# Patient Record
Sex: FEMALE | Race: BLACK OR AFRICAN AMERICAN | NOT HISPANIC OR LATINO | Employment: UNEMPLOYED | URBAN - METROPOLITAN AREA
[De-identification: names, ages, dates, MRNs, and addresses within clinical notes are randomized per-mention and may not be internally consistent; named-entity substitution may affect disease eponyms.]

---

## 2023-09-10 ENCOUNTER — HOSPITAL ENCOUNTER (OUTPATIENT)
Dept: DATA CONVERSION | Facility: HOSPITAL | Age: 28
Discharge: HOME | End: 2023-09-10
Payer: MEDICAID

## 2023-09-10 DIAGNOSIS — N39.0 URINARY TRACT INFECTION, SITE NOT SPECIFIED: ICD-10-CM

## 2023-09-10 DIAGNOSIS — S00.83XA CONTUSION OF OTHER PART OF HEAD, INITIAL ENCOUNTER: ICD-10-CM
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
## 2023-09-10 DIAGNOSIS — M54.50 LOW BACK PAIN, UNSPECIFIED: ICD-10-CM

## 2023-09-10 DIAGNOSIS — R51.9 HEADACHE, UNSPECIFIED: ICD-10-CM

## 2023-09-10 DIAGNOSIS — M54.2 CERVICALGIA: ICD-10-CM

## 2023-09-10 LAB
ALBUMIN SERPL-MCNC: 4.1 GM/DL (ref 3.5–5)
ALBUMIN/GLOB SERPL: 1.2 RATIO (ref 1.5–3)
ALP BLD-CCNC: 70 U/L (ref 35–125)
ALT SERPL-CCNC: 13 U/L (ref 5–40)
AMOXICILLIN+CLAV SUSC ISLT: NORMAL
AMPICILLIN+SULBAC SUSC ISLT: NORMAL
ANION GAP SERPL CALCULATED.3IONS-SCNC: 7 MMOL/L (ref 0–19)
AST SERPL-CCNC: 19 U/L (ref 5–40)
BACTERIA ISLT: NORMAL
BACTERIA SPEC CULT: NORMAL
BACTERIA UR QL AUTO: POSITIVE
BASOPHILS # BLD AUTO: 0.04 K/UL (ref 0–0.22)
BASOPHILS NFR BLD AUTO: 0.3 % (ref 0–1)
BILIRUB SERPL-MCNC: 0.9 MG/DL (ref 0.1–1.2)
BILIRUB UR QL STRIP.AUTO: NEGATIVE
BUN SERPL-MCNC: 9 MG/DL (ref 8–25)
BUN/CREAT SERPL: 11.3 RATIO (ref 8–21)
CALCIUM SERPL-MCNC: 9.2 MG/DL (ref 8.5–10.4)
CC # UR: NORMAL /UL
CEFAZOLIN SUSC ISLT: NORMAL
CHLORIDE SERPL-SCNC: 103 MMOL/L (ref 97–107)
CIPROFLOXACIN SUSC ISLT: NORMAL
CLARITY UR: ABNORMAL
CO2 SERPL-SCNC: 25 MMOL/L (ref 24–31)
COLOR UR: YELLOW
CREAT SERPL-MCNC: 0.8 MG/DL (ref 0.4–1.6)
DEPRECATED RDW RBC AUTO: 46.5 FL (ref 37–54)
DIFFERENTIAL METHOD BLD: ABNORMAL
EOSINOPHIL # BLD AUTO: 0.01 K/UL (ref 0–0.45)
EOSINOPHIL NFR BLD: 0.1 % (ref 0–3)
ERYTHROCYTE [DISTWIDTH] IN BLOOD BY AUTOMATED COUNT: 14.9 % (ref 11.7–15)
GENTAMICIN ISLT MLC: NORMAL
GFR SERPL CREATININE-BSD FRML MDRD: 102 ML/MIN/1.73 M2
GLOBULIN SER-MCNC: 3.3 G/DL (ref 1.9–3.7)
GLUCOSE SERPL-MCNC: 88 MG/DL (ref 65–99)
GLUCOSE UR STRIP.AUTO-MCNC: NEGATIVE MG/DL
HCG UR QL: NEGATIVE
HCT VFR BLD AUTO: 38.2 % (ref 36–44)
HGB BLD-MCNC: 12.8 GM/DL (ref 12–15)
HGB UR QL STRIP.AUTO: 3 /HPF (ref 0–3)
HGB UR QL: ABNORMAL
HYALINE CASTS UR QL AUTO: 6 /LPF
IMM GRANULOCYTES # BLD AUTO: 0.04 K/UL (ref 0–0.1)
KETONES UR QL STRIP.AUTO: ABNORMAL
LEUKOCYTE ESTERASE UR QL STRIP.AUTO: ABNORMAL
LEVOFLOXACIN SUSC ISLT: NORMAL
LYMPHOCYTES # BLD AUTO: 1.77 K/UL (ref 1.2–3.2)
LYMPHOCYTES NFR BLD MANUAL: 13 % (ref 20–40)
MCH RBC QN AUTO: 28.6 PG (ref 26–34)
MCHC RBC AUTO-ENTMCNC: 33.5 % (ref 31–37)
MCV RBC AUTO: 85.5 FL (ref 80–100)
MEROPENEM SUSC ISLT: NORMAL
MIC (SUSCEPTIBILITY): NORMAL
MICROSCOPIC (UA): ABNORMAL
MONOCYTES # BLD AUTO: 1.26 K/UL (ref 0–0.8)
MONOCYTES NFR BLD MANUAL: 9.3 % (ref 0–8)
NEUTROPHILS # BLD AUTO: 10.46 K/UL
NEUTROPHILS # BLD AUTO: 10.46 K/UL (ref 1.8–7.7)
NEUTROPHILS.IMMATURE NFR BLD: 0.3 % (ref 0–1)
NEUTS SEG NFR BLD: 77 % (ref 50–70)
NITRITE UR QL STRIP.AUTO: NEGATIVE
NITROFURANTOIN SUSC ISLT: NORMAL
NRBC BLD-RTO: 0 /100 WBC
PH UR STRIP.AUTO: 5.5 [PH] (ref 4.6–8)
PIP+TAZO SUSC ISLT: NORMAL
PLATELET # BLD AUTO: 273 K/UL (ref 150–450)
PMV BLD AUTO: 9.7 CU (ref 7–12.6)
POTASSIUM SERPL-SCNC: 3.9 MMOL/L (ref 3.4–5.1)
PROT SERPL-MCNC: 7.4 G/DL (ref 5.9–7.9)
PROT UR STRIP.AUTO-MCNC: ABNORMAL MG/DL
RBC # BLD AUTO: 4.47 M/UL (ref 4–4.9)
REPORT STATUS -LH SQ DATA CONVERSION: NORMAL
SERVICE CMNT-IMP: NORMAL
SODIUM SERPL-SCNC: 135 MMOL/L (ref 133–145)
SP GR UR STRIP.AUTO: 1.02 (ref 1–1.03)
SPECIMEN SOURCE: NORMAL
SQUAMOUS UR QL AUTO: ABNORMAL /HPF
TETRACYCLINE SUSC ISLT: NORMAL
TMP SMX SUSC ISLT: NORMAL
URINE CULTURE: ABNORMAL
UROBILINOGEN UR QL STRIP.AUTO: NORMAL MG/DL (ref 0–1)
WBC # BLD AUTO: 13.6 K/UL (ref 4.5–11)
WBC #/AREA URNS AUTO: 21 /HPF (ref 0–3)

## 2023-11-06 PROBLEM — S46.819A TRAPEZIUS STRAIN: Status: ACTIVE | Noted: 2023-11-06

## 2023-11-06 PROBLEM — S09.93XA INJURY OF FACE: Status: ACTIVE | Noted: 2023-11-06

## 2023-11-06 PROBLEM — S92.919A CLOSED FRACTURE OF PHALANX OF FOOT: Status: ACTIVE | Noted: 2023-11-06

## 2023-11-06 PROBLEM — N93.9 ABNORMAL VAGINAL BLEEDING: Status: ACTIVE | Noted: 2023-11-06

## 2023-11-06 PROBLEM — D50.9 IRON DEFICIENCY ANEMIA: Status: ACTIVE | Noted: 2023-11-06

## 2023-11-06 PROBLEM — B36.0 TINEA VERSICOLOR: Status: ACTIVE | Noted: 2023-11-06

## 2023-11-06 PROBLEM — J45.909 ASTHMA (HHS-HCC): Status: ACTIVE | Noted: 2023-11-06

## 2023-11-06 PROBLEM — M54.16 LUMBAR RADICULOPATHY: Status: ACTIVE | Noted: 2023-11-06

## 2023-11-06 RX ORDER — ESCITALOPRAM OXALATE 10 MG/1
10 TABLET ORAL DAILY
COMMUNITY
Start: 2023-01-10

## 2023-11-06 RX ORDER — ESCITALOPRAM OXALATE 20 MG/1
20 TABLET ORAL DAILY
COMMUNITY
Start: 2023-03-06 | End: 2023-11-15 | Stop reason: ALTCHOICE

## 2023-11-06 RX ORDER — ACETAMINOPHEN 500 MG
500 TABLET ORAL EVERY 4 HOURS PRN
COMMUNITY
Start: 2017-09-18 | End: 2023-11-15 | Stop reason: ALTCHOICE

## 2023-11-06 RX ORDER — PRAZOSIN HYDROCHLORIDE 1 MG/1
2 CAPSULE ORAL NIGHTLY
COMMUNITY
Start: 2023-03-06 | End: 2023-11-15 | Stop reason: ALTCHOICE

## 2023-11-06 RX ORDER — BUSPIRONE HYDROCHLORIDE 5 MG/1
10 TABLET ORAL 2 TIMES DAILY
COMMUNITY
Start: 2023-02-14 | End: 2023-11-15 | Stop reason: ALTCHOICE

## 2023-11-06 RX ORDER — HYDROXYZINE HYDROCHLORIDE 25 MG/1
25 TABLET, FILM COATED ORAL 3 TIMES DAILY PRN
COMMUNITY
Start: 2023-02-14 | End: 2023-11-15 | Stop reason: ALTCHOICE

## 2023-11-06 RX ORDER — TRAZODONE HYDROCHLORIDE 50 MG/1
.5-1 TABLET ORAL NIGHTLY PRN
COMMUNITY
Start: 2023-01-10 | End: 2023-11-15 | Stop reason: ALTCHOICE

## 2023-11-06 RX ORDER — TRAZODONE HYDROCHLORIDE 100 MG/1
100-200 TABLET ORAL NIGHTLY PRN
COMMUNITY
Start: 2023-03-06

## 2023-11-06 RX ORDER — ACETAMINOPHEN 325 MG/1
650 TABLET ORAL EVERY 6 HOURS PRN
COMMUNITY
End: 2023-11-15 | Stop reason: ALTCHOICE

## 2023-11-06 RX ORDER — HYDROXYZINE HYDROCHLORIDE 10 MG/1
1 TABLET, FILM COATED ORAL 3 TIMES DAILY PRN
COMMUNITY
Start: 2017-02-07 | End: 2023-11-15 | Stop reason: ALTCHOICE

## 2023-11-14 ENCOUNTER — APPOINTMENT (OUTPATIENT)
Dept: HEMATOLOGY/ONCOLOGY | Facility: CLINIC | Age: 28
End: 2023-11-14
Payer: MEDICAID

## 2023-11-15 ENCOUNTER — OFFICE VISIT (OUTPATIENT)
Dept: HEMATOLOGY/ONCOLOGY | Facility: CLINIC | Age: 28
End: 2023-11-15
Payer: MEDICAID

## 2023-11-15 VITALS
BODY MASS INDEX: 33.84 KG/M2 | DIASTOLIC BLOOD PRESSURE: 84 MMHG | TEMPERATURE: 95.9 F | RESPIRATION RATE: 18 BRPM | HEART RATE: 53 BPM | HEIGHT: 62 IN | SYSTOLIC BLOOD PRESSURE: 122 MMHG | OXYGEN SATURATION: 98 % | WEIGHT: 183.86 LBS

## 2023-11-15 DIAGNOSIS — D50.9 IRON DEFICIENCY ANEMIA, UNSPECIFIED: ICD-10-CM

## 2023-11-15 DIAGNOSIS — R23.3 EASY BRUISABILITY: ICD-10-CM

## 2023-11-15 DIAGNOSIS — D50.8 OTHER IRON DEFICIENCY ANEMIA: Primary | ICD-10-CM

## 2023-11-15 LAB
BASOPHILS # BLD AUTO: 0.05 X10*3/UL (ref 0–0.1)
BASOPHILS NFR BLD AUTO: 0.7 %
EOSINOPHIL # BLD AUTO: 0.06 X10*3/UL (ref 0–0.7)
EOSINOPHIL NFR BLD AUTO: 0.8 %
ERYTHROCYTE [DISTWIDTH] IN BLOOD BY AUTOMATED COUNT: 14.9 % (ref 11.5–14.5)
FERRITIN SERPL-MCNC: 23 NG/ML (ref 8–150)
HCT VFR BLD AUTO: 36.1 % (ref 36–46)
HGB BLD-MCNC: 11.6 G/DL (ref 12–16)
IMM GRANULOCYTES # BLD AUTO: 0.01 X10*3/UL (ref 0–0.7)
IMM GRANULOCYTES NFR BLD AUTO: 0.1 % (ref 0–0.9)
INR PPP: 1.2 (ref 0.9–1.1)
IRON SATN MFR SERPL: 13 % (ref 25–45)
IRON SERPL-MCNC: 44 UG/DL (ref 35–150)
LYMPHOCYTES # BLD AUTO: 1.6 X10*3/UL (ref 1.2–4.8)
LYMPHOCYTES NFR BLD AUTO: 21.3 %
MCH RBC QN AUTO: 28.1 PG (ref 26–34)
MCHC RBC AUTO-ENTMCNC: 32.1 G/DL (ref 32–36)
MCV RBC AUTO: 87 FL (ref 80–100)
MONOCYTES # BLD AUTO: 0.62 X10*3/UL (ref 0.1–1)
MONOCYTES NFR BLD AUTO: 8.2 %
NEUTROPHILS # BLD AUTO: 5.18 X10*3/UL (ref 1.2–7.7)
NEUTROPHILS NFR BLD AUTO: 68.9 %
NRBC BLD-RTO: 0 /100 WBCS (ref 0–0)
PLATELET # BLD AUTO: 266 X10*3/UL (ref 150–450)
PROTHROMBIN TIME: 13.6 SECONDS (ref 9.8–12.8)
RBC # BLD AUTO: 4.13 X10*6/UL (ref 4–5.2)
TIBC SERPL-MCNC: 335 UG/DL (ref 240–445)
UIBC SERPL-MCNC: 291 UG/DL (ref 110–370)
WBC # BLD AUTO: 7.5 X10*3/UL (ref 4.4–11.3)

## 2023-11-15 PROCEDURE — 99213 OFFICE O/P EST LOW 20 MIN: CPT | Performed by: NURSE PRACTITIONER

## 2023-11-15 PROCEDURE — 36415 COLL VENOUS BLD VENIPUNCTURE: CPT | Performed by: NURSE PRACTITIONER

## 2023-11-15 PROCEDURE — 83540 ASSAY OF IRON: CPT | Performed by: NURSE PRACTITIONER

## 2023-11-15 PROCEDURE — 82728 ASSAY OF FERRITIN: CPT | Performed by: NURSE PRACTITIONER

## 2023-11-15 PROCEDURE — 85610 PROTHROMBIN TIME: CPT | Performed by: NURSE PRACTITIONER

## 2023-11-15 PROCEDURE — 85025 COMPLETE CBC W/AUTO DIFF WBC: CPT | Performed by: NURSE PRACTITIONER

## 2023-11-15 ASSESSMENT — COLUMBIA-SUICIDE SEVERITY RATING SCALE - C-SSRS
4. HAVE YOU HAD THESE THOUGHTS AND HAD SOME INTENTION OF ACTING ON THEM?: NO
2. HAVE YOU ACTUALLY HAD ANY THOUGHTS OF KILLING YOURSELF?: NO
5. HAVE YOU STARTED TO WORK OUT OR WORKED OUT THE DETAILS OF HOW TO KILL YOURSELF? DO YOU INTEND TO CARRY OUT THIS PLAN?: NO
6. HAVE YOU EVER DONE ANYTHING, STARTED TO DO ANYTHING, OR PREPARED TO DO ANYTHING TO END YOUR LIFE?: NO
1. IN THE PAST MONTH, HAVE YOU WISHED YOU WERE DEAD OR WISHED YOU COULD GO TO SLEEP AND NOT WAKE UP?: YES

## 2023-11-15 ASSESSMENT — PATIENT HEALTH QUESTIONNAIRE - PHQ9
7. TROUBLE CONCENTRATING ON THINGS, SUCH AS READING THE NEWSPAPER OR WATCHING TELEVISION: NOT AT ALL
SUM OF ALL RESPONSES TO PHQ QUESTIONS 1-9: 17
4. FEELING TIRED OR HAVING LITTLE ENERGY: NEARLY EVERY DAY
1. LITTLE INTEREST OR PLEASURE IN DOING THINGS: NEARLY EVERY DAY
10. IF YOU CHECKED OFF ANY PROBLEMS, HOW DIFFICULT HAVE THESE PROBLEMS MADE IT FOR YOU TO DO YOUR WORK, TAKE CARE OF THINGS AT HOME, OR GET ALONG WITH OTHER PEOPLE: EXTREMELY DIFFICULT
9. THOUGHTS THAT YOU WOULD BE BETTER OFF DEAD, OR OF HURTING YOURSELF: SEVERAL DAYS
5. POOR APPETITE OR OVEREATING: NEARLY EVERY DAY
2. FEELING DOWN, DEPRESSED OR HOPELESS: NEARLY EVERY DAY
SUM OF ALL RESPONSES TO PHQ9 QUESTIONS 1 AND 2: 6
6. FEELING BAD ABOUT YOURSELF - OR THAT YOU ARE A FAILURE OR HAVE LET YOURSELF OR YOUR FAMILY DOWN: SEVERAL DAYS
3. TROUBLE FALLING OR STAYING ASLEEP OR SLEEPING TOO MUCH: NEARLY EVERY DAY
8. MOVING OR SPEAKING SO SLOWLY THAT OTHER PEOPLE COULD HAVE NOTICED. OR THE OPPOSITE, BEING SO FIGETY OR RESTLESS THAT YOU HAVE BEEN MOVING AROUND A LOT MORE THAN USUAL: NOT AT ALL

## 2023-11-15 ASSESSMENT — ENCOUNTER SYMPTOMS
DEPRESSION: 0
LOSS OF SENSATION IN FEET: 0
OCCASIONAL FEELINGS OF UNSTEADINESS: 0

## 2023-11-15 ASSESSMENT — PAIN SCALES - GENERAL: PAINLEVEL: 0-NO PAIN

## 2023-11-15 NOTE — PROGRESS NOTES
Patient ID: Mary Norman is a 28 y.o. female.    Interval History:  Patient presents today for initial hematology consultation for abnormal labs, referred by her primary care provider Dr. Alivia Vo. Patient is followed at [facility] in [place]. Most recent labs available to me in EMR are dated May 6, 2021. At that time WBC 10.4, HGB/HCT 12.0/39.1, ,000 MCV 83.4. CMP - WNL. Iron 94, TIBC 339, % saturation 27.7. Patient does have access to her most recent set of labs on her phone jillian. Labs completed December 15, 2022 identify a WBC 9.6, HGB/HCT 9.9/32.9, ,000 MCV 71. CMP - WNL. Iron 12, TIBC 380, % saturation 12 and Ferritin 4.  She reports she is here today to talk about her thyroid function.  I educated her that would be a visit with endocrinology and I do not see thyroid function test drawn in any of the labs she has shown me today.  I do however review with her that she is anemic and iron deficient based on the labs she is sharing with me.  She reports she did not know she was currently anemic.  But she has had difficulty with anemia since at least age 17 at which time she was found to be anemic during her first pregnancy.  She reports she was instructed to take oral iron with each of her first 3 pregnancies but does not believe it ever improved her counts.  She reports she received 1 dose of IV iron with her fourth and fifth pregnancy.  She states she had heavy periods with clotting as a teenager but at this time has much lighter periods she reports no clotting, currently 3 days of heavier flow with periods lasting a total of 7 days.  She has no other signs or symptoms of active bleeding or unusual bruising.  She does not experience pica symptom of ice chewing.     November 15. 2023  Patient returns today for routine follow up evaluation for history of iron deficiency anemia. She received 3 doses of IV Venofer without incident, April 13, 27 and May 8, 2023. On presentation today  "she denies any fevers, chills or night sweats. No cough, chest pain or shortness of breath. No nausea or vomiting. No constipation or diarrhea. Menstrual cycles have been mild. No other symptom complaints today    Past Surgical History:  Tubal Ligation    Review of Systems:  A review of systems has been completed and are negative for complaints except what is stated in the HPI and/or past medical history    Allergies:  NKDA    Medications:  Medication list reviewed with patient and updated in EMR    Vital Signs:  /84 (BP Location: Right arm, Patient Position: Sitting, BP Cuff Size: Adult)   Pulse 53   Temp 35.5 °C (95.9 °F) (Temporal)   Resp 18   Ht 1.568 m (5' 1.73\")   Wt 83.4 kg (183 lb 13.8 oz)   SpO2 98%   BMI 33.92 kg/m²     Physical Exam:  ECO  Pain: 0  Constitutional: Well developed, awake/alert/oriented x3, no distress, alert and cooperative  Eyes: PER. sclera anicteric  ENMT: Oral mucosa moist  Respiratory/Thorax: Breathing is non-labored. Lungs are clear to auscultation bilaterally. No adventitious breath sounds  Cardiovascular: S1-S2. Regular rate and rhythm. No murmurs, rubs, or gallops appreciated  Gastrointestinal: Abdomen soft nontender, nondistended, normal active bowel sounds.   Musculoskeletal: ROM intact, no joint swelling, normal strength  Extremities: normal extremities, no cyanosis, no edema, no clubbing  Neurologic: alert and oriented x3. Nonfocal exam. No myoclonus  Psychological: Pleasant, appropriate and easily engaged     Labs:  September 10, 2023  WBC 13.6, hemoglobin 12.8, hematocrit 38.3, platelets 273,000    November 15, 2023  WBC 7.5, hemoglobin 11.6, hematocrit 36.1, platelets 266,000    CBC date/time       WBC     HGB     HCT     PLT     Neut      2023 10:43   8.4     12.2    38.3    258     5.61  15-Mar-2023 12:20   10.8    9.5(L)  31.7(L) 424     7.97(H)    Assessment:  Iron deficiency anemia:  - Received 3 doses of IV Venofer ( and " 2023)  - - awaiting today's iron studies    Plan:  Follow-up:  - 4 months  - labs 1 week prior (CBCd, iron panel, ferritin)      SCOTT Peres-CNP

## 2023-11-15 NOTE — PROGRESS NOTES
Patient here for follow up with You Mitchell.     Medications and allergies reviewed. Patient stated she is out of her lexapro and trazadone- informed her to follow up with [facility] who prescribes them to her.     Labs to be drawn today.     Patient feels extremely tired lately, denies bruising and bleeding at this time.

## 2023-11-16 DIAGNOSIS — D50.0 IRON DEFICIENCY ANEMIA DUE TO CHRONIC BLOOD LOSS: ICD-10-CM

## 2023-11-16 DIAGNOSIS — K90.49 MALABSORPTION DUE TO INTOLERANCE, NOT ELSEWHERE CLASSIFIED: Primary | ICD-10-CM

## 2023-11-16 RX ORDER — EPINEPHRINE 0.3 MG/.3ML
0.3 INJECTION SUBCUTANEOUS EVERY 5 MIN PRN
Status: CANCELLED | OUTPATIENT
Start: 2023-11-21

## 2023-11-16 RX ORDER — ALBUTEROL SULFATE 0.83 MG/ML
3 SOLUTION RESPIRATORY (INHALATION) AS NEEDED
Status: CANCELLED | OUTPATIENT
Start: 2023-11-21

## 2023-11-16 RX ORDER — DIPHENHYDRAMINE HYDROCHLORIDE 50 MG/ML
50 INJECTION INTRAMUSCULAR; INTRAVENOUS AS NEEDED
Status: CANCELLED | OUTPATIENT
Start: 2023-11-21

## 2023-11-16 RX ORDER — HEPARIN SODIUM,PORCINE/PF 10 UNIT/ML
50 SYRINGE (ML) INTRAVENOUS AS NEEDED
Status: CANCELLED | OUTPATIENT
Start: 2023-11-21

## 2023-11-16 RX ORDER — FAMOTIDINE 10 MG/ML
20 INJECTION INTRAVENOUS ONCE AS NEEDED
Status: CANCELLED | OUTPATIENT
Start: 2023-11-21

## 2023-11-21 ENCOUNTER — INFUSION (OUTPATIENT)
Dept: HEMATOLOGY/ONCOLOGY | Facility: CLINIC | Age: 28
End: 2023-11-21
Payer: MEDICAID

## 2023-11-21 VITALS
HEART RATE: 65 BPM | DIASTOLIC BLOOD PRESSURE: 72 MMHG | BODY MASS INDEX: 33.84 KG/M2 | RESPIRATION RATE: 18 BRPM | TEMPERATURE: 97.5 F | WEIGHT: 183.42 LBS | SYSTOLIC BLOOD PRESSURE: 116 MMHG

## 2023-11-21 DIAGNOSIS — K90.49 MALABSORPTION DUE TO INTOLERANCE, NOT ELSEWHERE CLASSIFIED: ICD-10-CM

## 2023-11-21 DIAGNOSIS — D50.0 IRON DEFICIENCY ANEMIA DUE TO CHRONIC BLOOD LOSS: ICD-10-CM

## 2023-11-21 PROCEDURE — 96365 THER/PROPH/DIAG IV INF INIT: CPT | Mod: INF

## 2023-11-21 PROCEDURE — 2500000004 HC RX 250 GENERAL PHARMACY W/ HCPCS (ALT 636 FOR OP/ED): Mod: SE | Performed by: NURSE PRACTITIONER

## 2023-11-21 RX ORDER — HEPARIN SODIUM,PORCINE/PF 10 UNIT/ML
50 SYRINGE (ML) INTRAVENOUS AS NEEDED
OUTPATIENT
Start: 2023-11-21

## 2023-11-21 RX ORDER — EPINEPHRINE 0.3 MG/.3ML
0.3 INJECTION SUBCUTANEOUS EVERY 5 MIN PRN
OUTPATIENT
Start: 2023-11-28

## 2023-11-21 RX ORDER — FAMOTIDINE 10 MG/ML
20 INJECTION INTRAVENOUS ONCE AS NEEDED
OUTPATIENT
Start: 2023-11-28

## 2023-11-21 RX ORDER — ALBUTEROL SULFATE 0.83 MG/ML
3 SOLUTION RESPIRATORY (INHALATION) AS NEEDED
OUTPATIENT
Start: 2023-11-28

## 2023-11-21 RX ORDER — HEPARIN SODIUM,PORCINE/PF 10 UNIT/ML
50 SYRINGE (ML) INTRAVENOUS AS NEEDED
Status: DISCONTINUED | OUTPATIENT
Start: 2023-11-21 | End: 2023-11-21 | Stop reason: HOSPADM

## 2023-11-21 RX ORDER — DIPHENHYDRAMINE HYDROCHLORIDE 50 MG/ML
50 INJECTION INTRAMUSCULAR; INTRAVENOUS AS NEEDED
OUTPATIENT
Start: 2023-11-28

## 2023-11-21 RX ADMIN — IRON SUCROSE 300 MG: 20 INJECTION, SOLUTION INTRAVENOUS at 13:31

## 2023-11-21 ASSESSMENT — PAIN SCALES - GENERAL: PAINLEVEL: 0-NO PAIN

## 2023-11-28 ENCOUNTER — APPOINTMENT (OUTPATIENT)
Dept: HEMATOLOGY/ONCOLOGY | Facility: CLINIC | Age: 28
End: 2023-11-28
Payer: MEDICAID

## 2024-03-28 ENCOUNTER — APPOINTMENT (OUTPATIENT)
Dept: PRIMARY CARE | Facility: CLINIC | Age: 29
End: 2024-03-28
Payer: MEDICAID

## 2024-05-13 ENCOUNTER — APPOINTMENT (OUTPATIENT)
Dept: HEMATOLOGY/ONCOLOGY | Facility: CLINIC | Age: 29
End: 2024-05-13
Payer: MEDICAID

## 2024-12-19 ENCOUNTER — APPOINTMENT (OUTPATIENT)
Dept: HEMATOLOGY/ONCOLOGY | Facility: CLINIC | Age: 29
End: 2024-12-19
Payer: MEDICAID

## 2024-12-20 ENCOUNTER — APPOINTMENT (OUTPATIENT)
Dept: HEMATOLOGY/ONCOLOGY | Facility: CLINIC | Age: 29
End: 2024-12-20
Payer: MEDICAID